# Patient Record
Sex: MALE | Race: NATIVE HAWAIIAN OR OTHER PACIFIC ISLANDER | Employment: FULL TIME | URBAN - METROPOLITAN AREA
[De-identification: names, ages, dates, MRNs, and addresses within clinical notes are randomized per-mention and may not be internally consistent; named-entity substitution may affect disease eponyms.]

---
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
## 2024-11-26 ENCOUNTER — OFFICE VISIT (OUTPATIENT)
Age: 24
End: 2024-11-26

## 2024-11-26 VITALS
HEART RATE: 77 BPM | BODY MASS INDEX: 19.89 KG/M2 | WEIGHT: 155 LBS | HEIGHT: 74 IN | DIASTOLIC BLOOD PRESSURE: 88 MMHG | SYSTOLIC BLOOD PRESSURE: 137 MMHG | OXYGEN SATURATION: 99 % | TEMPERATURE: 98.1 F

## 2024-11-26 DIAGNOSIS — R07.89 FEELING OF CHEST TIGHTNESS: Primary | ICD-10-CM

## 2024-11-26 RX ORDER — METHYLPREDNISOLONE 4 MG/1
TABLET ORAL
Qty: 21 TABLET | Refills: 0 | Status: SHIPPED | OUTPATIENT
Start: 2024-11-26 | End: 2024-11-26

## 2024-11-26 RX ORDER — METHYLPREDNISOLONE 4 MG/1
TABLET ORAL
Qty: 21 TABLET | Refills: 0 | Status: SHIPPED | OUTPATIENT
Start: 2024-11-26 | End: 2024-12-02

## 2024-11-26 ASSESSMENT — ENCOUNTER SYMPTOMS: SHORTNESS OF BREATH: 1

## 2024-11-26 NOTE — PROGRESS NOTES
Ck Rosas (:  2000) is a 24 y.o. male,New patient, here for evaluation of the following chief complaint(s):  Shortness of Breath (Patient c/o having shortness of breath that started 4 days ago)      ASSESSMENT/PLAN:  1. Feeling of chest tightness  - methylPREDNISolone (MEDROL DOSEPACK) 4 MG tablet; Take by mouth.  Dispense: 21 tablet; Refill: 0       Return if symptoms worsen or fail to improve.    SUBJECTIVE/OBJECTIVE:  PRESENT TO CLINIC WITH SHORTNESS OF BREATH FOR FOUR DAYS.NO HISTORY OF ASTHMA. NO FEVER, NO RUNNY NOSE.      History provided by:  Patient  Shortness of Breath        Vitals:    24 1217   BP: 137/88   Site: Right Upper Arm   Position: Sitting   Cuff Size: Large Adult   Pulse: 77   Temp: 98.1 °F (36.7 °C)   TempSrc: Oral   SpO2: 99%   Weight: 70.3 kg (155 lb)   Height: 1.88 m (6' 2\")       Review of Systems   Respiratory:  Positive for shortness of breath.        Physical Exam  Constitutional:       Appearance: Normal appearance.   HENT:      Head: Normocephalic and atraumatic.      Nose: Nose normal.      Mouth/Throat:      Mouth: Mucous membranes are moist.   Eyes:      Pupils: Pupils are equal, round, and reactive to light.   Pulmonary:      Effort: Pulmonary effort is normal.      Breath sounds: Normal breath sounds.   Musculoskeletal:         General: Normal range of motion.      Cervical back: Normal range of motion and neck supple.   Skin:     General: Skin is warm.   Neurological:      Mental Status: He is alert and oriented to person, place, and time.   Psychiatric:         Mood and Affect: Mood normal.         Behavior: Behavior normal.           An electronic signature was used to authenticate this note.    --Federico Grubbs DO

## 2025-06-02 ENCOUNTER — OFFICE VISIT (OUTPATIENT)
Age: 25
End: 2025-06-02

## 2025-06-02 VITALS
TEMPERATURE: 98.2 F | OXYGEN SATURATION: 98 % | WEIGHT: 154.1 LBS | DIASTOLIC BLOOD PRESSURE: 76 MMHG | BODY MASS INDEX: 19.78 KG/M2 | HEIGHT: 74 IN | HEART RATE: 104 BPM | SYSTOLIC BLOOD PRESSURE: 131 MMHG

## 2025-06-02 DIAGNOSIS — R30.0 BURNING WITH URINATION: ICD-10-CM

## 2025-06-02 DIAGNOSIS — N34.2 URETHRITIS: ICD-10-CM

## 2025-06-02 DIAGNOSIS — Z20.2 POSSIBLE EXPOSURE TO STI: ICD-10-CM

## 2025-06-02 DIAGNOSIS — A74.9 CHLAMYDIA: Primary | ICD-10-CM

## 2025-06-02 LAB
BILIRUBIN, POC: ABNORMAL
BLOOD URINE, POC: ABNORMAL
CLARITY, POC: CLEAR
COLOR, POC: ABNORMAL
GLUCOSE URINE, POC: ABNORMAL MG/DL
KETONES, POC: ABNORMAL MG/DL
LEUKOCYTE EST, POC: ABNORMAL
NITRITE, POC: ABNORMAL
PH, POC: 5.5
PROTEIN, POC: ABNORMAL MG/DL
SPECIFIC GRAVITY, POC: 1.02
UROBILINOGEN, POC: 0.2 MG/DL

## 2025-06-02 ASSESSMENT — ENCOUNTER SYMPTOMS
FACIAL SWELLING: 0
SINUS PAIN: 0
COUGH: 0
GASTROINTESTINAL NEGATIVE: 1
EYES NEGATIVE: 1
DIARRHEA: 0
NAUSEA: 0
RESPIRATORY NEGATIVE: 1
TROUBLE SWALLOWING: 0
ABDOMINAL PAIN: 0
SHORTNESS OF BREATH: 0
VOMITING: 0
WHEEZING: 0
RHINORRHEA: 0
SORE THROAT: 0
VOICE CHANGE: 0
EYE PAIN: 0

## 2025-06-02 NOTE — PROGRESS NOTES
Ck Rosas (:  2000) is a 25 y.o. male,Established patient, here for evaluation of the following chief complaint(s):  Urinary Tract Infection (C/o burning with urination X 2 days )    Patient here for evaluation of burning with urination. Further discussion, reveals a need to rule out sti infection. POC urine abnormal, urine cx sent. Will call patient with results. Agreed to wait for call to start treatment as indicated.     ASSESSMENT/PLAN:  1. Urethritis  -You may take Acetaminophen or Ibuprofen as directed on packaging for fever or discomfort.   - POCT Urinalysis no Micro  Results for orders placed or performed in visit on 25   POCT Urinalysis no Micro   Result Value Ref Range    Color, UA dark yellow     Clarity, UA clear     Glucose, UA POC neg mg/dL    Bilirubin, UA 1+ (A)     Ketones, UA 2+ (A) mg/dL    Spec Grav, UA 1.025     Blood, UA POC 1+ (A)     pH, UA 5.5     Protein, UA POC neg mg/dL    Urobilinogen, UA 0.2 mg/dL    Leukocytes, UA neg     Nitrite, UA neg       - Culture, Urine  - Trichomonas by EIA; Future  - C.trachomatis N.gonorrhoeae DNA, Urine ([place] Only); Future  - Trichomonas by EIA  - C.trachomatis N.gonorrhoeae DNA, Urine ([place] Only)    2. Burning with urination    - POCT Urinalysis no Micro  - Culture, Urine  - Trichomonas by EIA; Future  - C.trachomatis N.gonorrhoeae DNA, Urine ([place] Only); Future  - Trichomonas by EIA  - C.trachomatis N.gonorrhoeae DNA, Urine ([place] Only)    3. Possible exposure to STI  - Trichomonas by EIA; Future  - C.trachomatis N.gonorrhoeae DNA, Urine ([place] Only); Future  - Trichomonas by EIA  - C.trachomatis N.gonorrhoeae DNA, Urine ([place] Only)       Return if symptoms worsen or fail to improve.    SUBJECTIVE/OBJECTIVE:  Patient complains of burning with urination and intermittent discharge.  He has had symptoms for 2 days. Patient denies back pain, fever, headache, and sorethroat. Patient does not have a history of

## 2025-06-03 LAB
BACTERIA UR CULT: NORMAL
C TRACH DNA UR QL NAA+PROBE: POSITIVE
N GONORRHOEA DNA UR QL NAA+PROBE: NEGATIVE

## 2025-06-04 ENCOUNTER — RESULTS FOLLOW-UP (OUTPATIENT)
Age: 25
End: 2025-06-04

## 2025-06-04 LAB
REASON FOR REJECTION: NORMAL
REJECTED TEST: NORMAL

## 2025-06-04 RX ORDER — DOXYCYCLINE HYCLATE 100 MG
100 TABLET ORAL 2 TIMES DAILY
Qty: 20 TABLET | Refills: 0 | Status: SHIPPED | OUTPATIENT
Start: 2025-06-04 | End: 2025-06-14